# Patient Record
Sex: MALE | Race: WHITE | URBAN - METROPOLITAN AREA
[De-identification: names, ages, dates, MRNs, and addresses within clinical notes are randomized per-mention and may not be internally consistent; named-entity substitution may affect disease eponyms.]

---

## 2019-01-03 ENCOUNTER — APPOINTMENT (OUTPATIENT)
Dept: CT IMAGING | Facility: CLINIC | Age: 39
End: 2019-01-03
Payer: MEDICAID

## 2019-01-03 ENCOUNTER — HOSPITAL ENCOUNTER (EMERGENCY)
Facility: CLINIC | Age: 39
Discharge: HOME | End: 2019-01-03
Attending: EMERGENCY MEDICINE | Admitting: EMERGENCY MEDICINE
Payer: MEDICAID

## 2019-01-03 VITALS
DIASTOLIC BLOOD PRESSURE: 81 MMHG | HEART RATE: 90 BPM | RESPIRATION RATE: 18 BRPM | HEIGHT: 63 IN | BODY MASS INDEX: 30.83 KG/M2 | WEIGHT: 174 LBS | TEMPERATURE: 99.1 F | OXYGEN SATURATION: 97 % | SYSTOLIC BLOOD PRESSURE: 116 MMHG

## 2019-01-03 DIAGNOSIS — R10.84 ABDOMINAL PAIN, GENERALIZED: ICD-10-CM

## 2019-01-03 LAB
ALBUMIN SERPL-MCNC: 3.9 G/DL (ref 3.4–5)
ALBUMIN UR-MCNC: NEGATIVE MG/DL
ALP SERPL-CCNC: 83 U/L (ref 40–150)
ALT SERPL W P-5'-P-CCNC: 47 U/L (ref 0–70)
ANION GAP SERPL CALCULATED.3IONS-SCNC: 8 MMOL/L (ref 3–14)
APPEARANCE UR: CLEAR
AST SERPL W P-5'-P-CCNC: 19 U/L (ref 0–45)
BASOPHILS # BLD AUTO: 0 10E9/L (ref 0–0.2)
BASOPHILS NFR BLD AUTO: 0.2 %
BILIRUB SERPL-MCNC: 0.6 MG/DL (ref 0.2–1.3)
BILIRUB UR QL STRIP: NEGATIVE
BUN SERPL-MCNC: 14 MG/DL (ref 7–30)
CALCIUM SERPL-MCNC: 9.1 MG/DL (ref 8.5–10.1)
CHLORIDE SERPL-SCNC: 99 MMOL/L (ref 94–109)
CO2 SERPL-SCNC: 27 MMOL/L (ref 20–32)
COLOR UR AUTO: YELLOW
CREAT SERPL-MCNC: 0.82 MG/DL (ref 0.66–1.25)
DIFFERENTIAL METHOD BLD: ABNORMAL
EOSINOPHIL # BLD AUTO: 0 10E9/L (ref 0–0.7)
EOSINOPHIL NFR BLD AUTO: 0.1 %
ERYTHROCYTE [DISTWIDTH] IN BLOOD BY AUTOMATED COUNT: 13.4 % (ref 10–15)
GFR SERPL CREATININE-BSD FRML MDRD: >90 ML/MIN/{1.73_M2}
GLUCOSE SERPL-MCNC: 108 MG/DL (ref 70–99)
GLUCOSE UR STRIP-MCNC: NEGATIVE MG/DL
HCT VFR BLD AUTO: 42.3 % (ref 40–53)
HGB BLD-MCNC: 14.4 G/DL (ref 13.3–17.7)
HGB UR QL STRIP: NEGATIVE
IMM GRANULOCYTES # BLD: 0 10E9/L (ref 0–0.4)
IMM GRANULOCYTES NFR BLD: 0.3 %
KETONES UR STRIP-MCNC: NEGATIVE MG/DL
LEUKOCYTE ESTERASE UR QL STRIP: NEGATIVE
LIPASE SERPL-CCNC: 76 U/L (ref 73–393)
LYMPHOCYTES # BLD AUTO: 1.4 10E9/L (ref 0.8–5.3)
LYMPHOCYTES NFR BLD AUTO: 11.6 %
MCH RBC QN AUTO: 28.5 PG (ref 26.5–33)
MCHC RBC AUTO-ENTMCNC: 34 G/DL (ref 31.5–36.5)
MCV RBC AUTO: 84 FL (ref 78–100)
MONOCYTES # BLD AUTO: 1.3 10E9/L (ref 0–1.3)
MONOCYTES NFR BLD AUTO: 11.1 %
MUCOUS THREADS #/AREA URNS LPF: PRESENT /LPF
NEUTROPHILS # BLD AUTO: 9.1 10E9/L (ref 1.6–8.3)
NEUTROPHILS NFR BLD AUTO: 76.7 %
NITRATE UR QL: NEGATIVE
NRBC # BLD AUTO: 0 10*3/UL
NRBC BLD AUTO-RTO: 0 /100
PH UR STRIP: 6 PH (ref 5–7)
PLATELET # BLD AUTO: 255 10E9/L (ref 150–450)
POTASSIUM SERPL-SCNC: 4.1 MMOL/L (ref 3.4–5.3)
PROT SERPL-MCNC: 8.8 G/DL (ref 6.8–8.8)
RBC # BLD AUTO: 5.05 10E12/L (ref 4.4–5.9)
RBC #/AREA URNS AUTO: 1 /HPF (ref 0–2)
SODIUM SERPL-SCNC: 134 MMOL/L (ref 133–144)
SOURCE: ABNORMAL
SP GR UR STRIP: 1.04 (ref 1–1.03)
UROBILINOGEN UR STRIP-MCNC: NORMAL MG/DL (ref 0–2)
WBC # BLD AUTO: 11.9 10E9/L (ref 4–11)
WBC #/AREA URNS AUTO: 1 /HPF (ref 0–5)

## 2019-01-03 PROCEDURE — 83690 ASSAY OF LIPASE: CPT | Performed by: EMERGENCY MEDICINE

## 2019-01-03 PROCEDURE — 25000125 ZZHC RX 250: Performed by: EMERGENCY MEDICINE

## 2019-01-03 PROCEDURE — 85025 COMPLETE CBC W/AUTO DIFF WBC: CPT | Performed by: EMERGENCY MEDICINE

## 2019-01-03 PROCEDURE — 80053 COMPREHEN METABOLIC PANEL: CPT | Performed by: EMERGENCY MEDICINE

## 2019-01-03 PROCEDURE — 81001 URINALYSIS AUTO W/SCOPE: CPT | Performed by: EMERGENCY MEDICINE

## 2019-01-03 PROCEDURE — 74177 CT ABD & PELVIS W/CONTRAST: CPT

## 2019-01-03 PROCEDURE — 25000128 H RX IP 250 OP 636: Performed by: EMERGENCY MEDICINE

## 2019-01-03 PROCEDURE — 99285 EMERGENCY DEPT VISIT HI MDM: CPT | Mod: 25

## 2019-01-03 RX ORDER — IOPAMIDOL 755 MG/ML
88 INJECTION, SOLUTION INTRAVASCULAR ONCE
Status: COMPLETED | OUTPATIENT
Start: 2019-01-03 | End: 2019-01-03

## 2019-01-03 RX ADMIN — IOPAMIDOL 88 ML: 755 INJECTION, SOLUTION INTRAVENOUS at 08:49

## 2019-01-03 RX ADMIN — SODIUM CHLORIDE 67 ML: 9 INJECTION, SOLUTION INTRAVENOUS at 08:49

## 2019-01-03 ASSESSMENT — ENCOUNTER SYMPTOMS
DIARRHEA: 0
CONSTIPATION: 0
DYSURIA: 0
NAUSEA: 1
ABDOMINAL PAIN: 1
VOMITING: 0

## 2019-01-03 ASSESSMENT — MIFFLIN-ST. JEOR: SCORE: 1609.26
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
## 2019-01-03 NOTE — ED AVS SNAPSHOT
Emergency Department  [address] [facility] [address]  Phone:  [phone]  Fax:  [phone]                                    Marcelino Lopez   MRN: [id number]    Department:   Emergency Department   Date of Visit:  1/3/2019           After Visit Summary Signature Page    I have received my discharge instructions, and my questions have been answered. I have discussed any challenges I see with this plan with the nurse or doctor.    ..........................................................................................................................................  Patient/Patient Representative Signature      ..........................................................................................................................................  Patient Representative Print Name and Relationship to Patient    ..................................................               ................................................  Date                                   Time    ..........................................................................................................................................  Reviewed by Signature/Title    ...................................................              ..............................................  Date                                               Time          22EPIC Rev 08/18

## 2019-01-24 ENCOUNTER — HOSPITAL ENCOUNTER (EMERGENCY)
Facility: CLINIC | Age: 39
Discharge: HOME | End: 2019-01-24
Attending: EMERGENCY MEDICINE | Admitting: EMERGENCY MEDICINE
Payer: MEDICAID

## 2019-01-24 VITALS
SYSTOLIC BLOOD PRESSURE: 148 MMHG | TEMPERATURE: 98 F | RESPIRATION RATE: 16 BRPM | BODY MASS INDEX: 28.35 KG/M2 | WEIGHT: 160 LBS | DIASTOLIC BLOOD PRESSURE: 79 MMHG | HEART RATE: 72 BPM | OXYGEN SATURATION: 97 %

## 2019-01-24 DIAGNOSIS — M25.462 EFFUSION OF LEFT KNEE: ICD-10-CM

## 2019-01-24 LAB
APPEARANCE FLD: NORMAL
COLOR FLD: NORMAL
CRYSTALS SNV MICRO: NORMAL
GLUCOSE FLD-MCNC: 18 MG/DL
GRAM STN SPEC: NORMAL
LYMPHOCYTES NFR FLD MANUAL: 3 %
MONOS+MACROS NFR FLD MANUAL: 39 %
NEUTS BAND NFR FLD MANUAL: 58 %
PROT FLD-MCNC: 4.9 G/DL
SPECIMEN SOURCE FLD: NORMAL
SPECIMEN SOURCE: NORMAL
WBC # FLD AUTO: 5923 /UL

## 2019-01-24 PROCEDURE — 87070 CULTURE OTHR SPECIMN AEROBIC: CPT | Performed by: EMERGENCY MEDICINE

## 2019-01-24 PROCEDURE — 20610 DRAIN/INJ JOINT/BURSA W/O US: CPT | Mod: LT

## 2019-01-24 PROCEDURE — 89051 BODY FLUID CELL COUNT: CPT | Performed by: EMERGENCY MEDICINE

## 2019-01-24 PROCEDURE — 89060 EXAM SYNOVIAL FLUID CRYSTALS: CPT | Performed by: EMERGENCY MEDICINE

## 2019-01-24 PROCEDURE — 87205 SMEAR GRAM STAIN: CPT | Performed by: EMERGENCY MEDICINE

## 2019-01-24 PROCEDURE — 82945 GLUCOSE OTHER FLUID: CPT | Performed by: EMERGENCY MEDICINE

## 2019-01-24 PROCEDURE — 99284 EMERGENCY DEPT VISIT MOD MDM: CPT | Mod: 25

## 2019-01-24 PROCEDURE — 84157 ASSAY OF PROTEIN OTHER: CPT | Performed by: EMERGENCY MEDICINE

## 2019-01-24 RX ORDER — IBUPROFEN 600 MG/1
600 TABLET, FILM COATED ORAL EVERY 8 HOURS PRN
Qty: 30 TABLET | Refills: 0 | Status: SHIPPED | OUTPATIENT
Start: 2019-01-24 | End: 2019-02-23

## 2019-01-24 ASSESSMENT — ENCOUNTER SYMPTOMS
JOINT SWELLING: 1
FEVER: 0
DIAPHORESIS: 0
CHILLS: 0

## 2019-01-24 NOTE — ED PROVIDER NOTES
History     Chief Com plaint:  Knee swelling, pain    HPI   Marcelino Lopez is a 38 year old male who presents with left knee pain and swelling. The patient states that 4-5 days ago he began experiencing pain in his left knee and this morning he noticed that his knee was swollen and he was not able to bend the knee very well. The knee is more painful when standing. The patient denies any recent fever, chills, sweats, other aches or swelling, or penile discharge. He denies a history of STIs. 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     Allergies:  No Known Drug Allergies     Medications:    Medications reviewed. No current medications.      Past Medical History:    Medical history reviewed. No pertinent medical history.     Past Surgical History:    Surgical history reviewed. No pertinent surgical history.     Family History:    Family history reviewed. No pertinent family history.      Social History:  Patient presents alone  Current some day smoker  Positive for alcohol use.     Review of Systems   Constitutional: Negative for chills, diaphoresis and fever.   Genitourinary: Negative for discharge.   Musculoskeletal: Positive for joint swelling.        Left knee pain   All other systems reviewed and are negative.      Physical Exam   First Vitals:  BP: 125/74  Pulse: 68  Heart Rate: 75  Temp: 98  F (36.7  C)  Resp: 16  Weight: 72.6 kg (160 lb)  SpO2: 98 %      Physical Exam  General: Resting comfortably on the rney  Head:  The scalp, face, and head appear normal  Eyes:  The pupils are equal, round, and reactive to light    There is no nystagmus    Extraocular muscles are intact    Conjunctivae and sclerae are normal  ENT:    The nose is normal    Pinnae are normal    External acoustic canals are normal    Tympanic membranes are normal    The oropharynx is normal    Uvula is in the midline  Neck:  Normal range of motion    There is no rigidity noted    There is no midline cervical  spine pain/tenderness    Trachea is in the midline    No mass is detected  CV:  Regular rate and underlying rhythm     Normal S1 and S2    No S3 or S4    No pathological murmur detected  Resp:  Lungs are clear    There is no tachypnea    Non-labored    No rales    No wheezing   GI:  Abdomen is soft, there is no rigidity    No distension    No tympani    No rebound tenderness     Non-surgical without peritoneal features  :  Penis is normal    No urethral discharge    Uncircumcised    Testicles normal and non-tender, no mass    Epididymis normal    No inguinal hernia  MS:  Normal muscular tone    Symmetric motor strength    No major joint effusions    No asymmetric swelling. Left knee there is a moderate effusion noted. No warmth, redness, or   significant pain. The right knee is normal.     No calf tenderness  Skin:  No rash or acute skin lesions noted  Neuro: Speech is normal and fluent  Psych:  Awake. Alert.  Normal affect.  Appropriate interactions.  Lymph: No anterior or posterior cervical lymphadenopathy noted. No inguinal adenopathy.         Emergency Department Course   Laboratory:  Crystal ID joint fluid: No clinically significant crystals seen.   Cell count with differential fluid: all normal  Glucose fluid: 18  Protein fluid: protein total fluid: 4.9  Gram stain: No organisms seen (preliminary result)  Fluid culture aerobic bacterial: pending    Procedures:       Arthrocentesis     PERFORMED BY: Ryley Kinney MD.    SITE: Left knee.    INDICATION:  Knee effusion.    CONSENT: Obtained from patient.     ANESTHESIA: Bupivacaine 0.25 % without epi    NOTE:  Using sterile technique the site was prepped with above anesthetic. I used a lateral approach. The joint was entered and 38 cc's of blood tinged, straw tolo colored fluid was withdrawn and sent for laboratory analysis, results above.     COMPLICATIONS: Patient tolerated the procedure well with no immediate complications.    POST-PROCEDURE INSTRUCTIONS:  The patient is asked to continue to rest the joint for a few more days before resuming regular activities.  It may be more painful for the first 1-2 days.  Watch for fever, or increased swelling or persistent pain in the joint.     Emergency Department Course:  Nursing notes and vitals reviewed.  1122: I performed an exam of the patient as documented above.     1205: I rechecked the patient. I performed a knee arthrocentesis (see procedure note).    1418: I rechecked the patient. Findings and plan explained to the Patient. Patient discharged home with instructions regarding supportive care, medications, and reasons to return. The importance of close follow-up was reviewed.     Impression & Plan    Medical Decision Making:  This patient presents with a recent onset of left knee swelling.  He is on his feet frequently at work.  The patient did have a recent probable viral infection 2-3 weeks ago which could serve as a precipitant for a reactive inflammatory arthropathy.  The patient has no apparent risk factor for gonococcal monoarticular arthritis.  The joint fluid analysis is consistent with a low-grade inflammatory arthropathy.  Gram stain is negative with a low white count.  Antibiotics will not be prescribed at this time.  A knee immobilizer is placed for comfort and also to help prevent reaccumulation of inflammatory joint fluid.  Orthopedic follow-up as indicated next week we will call the patient if there is an abnormal culture result.        Diagnosis:    ICD-10-CM    1. Effusion of left knee M25.462        Disposition:  discharged to home    Discharge Medications:     Medication List      Started    ibuprofen 600 MG tablet  Commonly known as:  ADVIL/MOTRIN  600 mg, Oral, EVERY 8 HOURS PRN              Daryl WARREN, am serving as a scribe on 1/24/2019 at 11:22 AM to personally document services performed by Ryley Kinney MD based on my observations and the provider's statements to me.     Daryl  Gigstad  1/24/2019    EMERGENCY DEPARTMENT       Ryley Kinney MD  01/24/19 9416

## 2019-01-24 NOTE — DISCHARGE INSTRUCTIONS
Follow-up with orthopedic surgery next week for consultation and reevaluation  Wear your knee splint for the next 2-3 days when up and about and walking, you may take it off during sleeping  We will call you if your joint fluid culture is positive  Take ibuprofen as directed

## 2019-01-24 NOTE — ED AVS SNAPSHOT
Emergency Department  [address] [facility] [address]  Phone:  [phone]  Fax:  [phone]                                    Marcelino Lopze   MRN: [id number]    Department:   Emergency Department   Date of Visit:  1/24/2019           After Visit Summary Signature Page    I have received my discharge instructions, and my questions have been answered. I have discussed any challenges I see with this plan with the nurse or doctor.    ..........................................................................................................................................  Patient/Patient Representative Signature      ..........................................................................................................................................  Patient Representative Print Name and Relationship to Patient    ..................................................               ................................................  Date                                   Time    ..........................................................................................................................................  Reviewed by Signature/Title    ...................................................              ..............................................  Date                                               Time          22EPIC Rev 08/18

## 2019-01-24 NOTE — LETTER
January 24, 2019      To Whom It May Concern:      Asif Gibran Lopez was seen in our Emergency Department today, 01/24/19.  I expect his condition to improve over the next few days.  He may return to work/school on Monday.    Sincerely,

## 2019-01-25 ENCOUNTER — APPOINTMENT (OUTPATIENT)
Dept: GENERAL RADIOLOGY | Facility: CLINIC | Age: 39
End: 2019-01-25
Payer: MEDICAID

## 2019-01-25 ENCOUNTER — HOSPITAL ENCOUNTER (EMERGENCY)
Facility: CLINIC | Age: 39
Discharge: HOME | End: 2019-01-25
Attending: EMERGENCY MEDICINE | Admitting: EMERGENCY MEDICINE
Payer: MEDICAID

## 2019-01-25 VITALS
OXYGEN SATURATION: 99 % | HEART RATE: 72 BPM | DIASTOLIC BLOOD PRESSURE: 83 MMHG | TEMPERATURE: 98.6 F | RESPIRATION RATE: 16 BRPM | SYSTOLIC BLOOD PRESSURE: 151 MMHG

## 2019-01-25 DIAGNOSIS — M19.90 INFLAMMATORY ARTHRITIS: ICD-10-CM

## 2019-01-25 DIAGNOSIS — M25.462 EFFUSION OF BOTH KNEE JOINTS: ICD-10-CM

## 2019-01-25 DIAGNOSIS — M25.461 EFFUSION OF BOTH KNEE JOINTS: ICD-10-CM

## 2019-01-25 LAB
ANION GAP SERPL CALCULATED.3IONS-SCNC: 6 MMOL/L (ref 3–14)
BASOPHILS # BLD AUTO: 0 10E9/L (ref 0–0.2)
BASOPHILS NFR BLD AUTO: 0.3 %
BUN SERPL-MCNC: 11 MG/DL (ref 7–30)
CALCIUM SERPL-MCNC: 9 MG/DL (ref 8.5–10.1)
CHLORIDE SERPL-SCNC: 105 MMOL/L (ref 94–109)
CO2 SERPL-SCNC: 28 MMOL/L (ref 20–32)
CREAT SERPL-MCNC: 0.81 MG/DL (ref 0.66–1.25)
CRP SERPL-MCNC: 61.9 MG/L (ref 0–8)
DIFFERENTIAL METHOD BLD: NORMAL
EOSINOPHIL # BLD AUTO: 0.1 10E9/L (ref 0–0.7)
EOSINOPHIL NFR BLD AUTO: 0.8 %
ERYTHROCYTE [DISTWIDTH] IN BLOOD BY AUTOMATED COUNT: 13.1 % (ref 10–15)
ERYTHROCYTE [SEDIMENTATION RATE] IN BLOOD BY WESTERGREN METHOD: 41 MM/H (ref 0–15)
GFR SERPL CREATININE-BSD FRML MDRD: >90 ML/MIN/{1.73_M2}
GLUCOSE SERPL-MCNC: 117 MG/DL (ref 70–99)
HCT VFR BLD AUTO: 40.7 % (ref 40–53)
HGB BLD-MCNC: 13.8 G/DL (ref 13.3–17.7)
IMM GRANULOCYTES # BLD: 0 10E9/L (ref 0–0.4)
IMM GRANULOCYTES NFR BLD: 0.3 %
LYMPHOCYTES # BLD AUTO: 2.3 10E9/L (ref 0.8–5.3)
LYMPHOCYTES NFR BLD AUTO: 31.7 %
MCH RBC QN AUTO: 28.5 PG (ref 26.5–33)
MCHC RBC AUTO-ENTMCNC: 33.9 G/DL (ref 31.5–36.5)
MCV RBC AUTO: 84 FL (ref 78–100)
MONOCYTES # BLD AUTO: 0.9 10E9/L (ref 0–1.3)
MONOCYTES NFR BLD AUTO: 12.5 %
NEUTROPHILS # BLD AUTO: 3.9 10E9/L (ref 1.6–8.3)
NEUTROPHILS NFR BLD AUTO: 54.4 %
NRBC # BLD AUTO: 0 10*3/UL
NRBC BLD AUTO-RTO: 0 /100
PLATELET # BLD AUTO: 299 10E9/L (ref 150–450)
POTASSIUM SERPL-SCNC: 4.2 MMOL/L (ref 3.4–5.3)
RBC # BLD AUTO: 4.85 10E12/L (ref 4.4–5.9)
SODIUM SERPL-SCNC: 139 MMOL/L (ref 133–144)
URATE SERPL-MCNC: 5.9 MG/DL (ref 3.5–7.2)
WBC # BLD AUTO: 7.1 10E9/L (ref 4–11)

## 2019-01-25 PROCEDURE — 85025 COMPLETE CBC W/AUTO DIFF WBC: CPT | Performed by: EMERGENCY MEDICINE

## 2019-01-25 PROCEDURE — 99284 EMERGENCY DEPT VISIT MOD MDM: CPT

## 2019-01-25 PROCEDURE — 80048 BASIC METABOLIC PNL TOTAL CA: CPT | Performed by: EMERGENCY MEDICINE

## 2019-01-25 PROCEDURE — 87491 CHLMYD TRACH DNA AMP PROBE: CPT | Performed by: EMERGENCY MEDICINE

## 2019-01-25 PROCEDURE — 73562 X-RAY EXAM OF KNEE 3: CPT | Mod: 50

## 2019-01-25 PROCEDURE — 84550 ASSAY OF BLOOD/URIC ACID: CPT | Performed by: EMERGENCY MEDICINE

## 2019-01-25 PROCEDURE — 85652 RBC SED RATE AUTOMATED: CPT | Performed by: EMERGENCY MEDICINE

## 2019-01-25 PROCEDURE — 86140 C-REACTIVE PROTEIN: CPT | Performed by: EMERGENCY MEDICINE

## 2019-01-25 RX ORDER — ACETAMINOPHEN 500 MG
500-1000 TABLET ORAL EVERY 8 HOURS PRN
Qty: 60 TABLET | Refills: 0 | Status: SHIPPED | OUTPATIENT
Start: 2019-01-25 | End: 2019-02-01

## 2019-01-25 RX ORDER — PREDNISONE 20 MG/1
TABLET ORAL
Qty: 10 TABLET | Refills: 0 | Status: SHIPPED | OUTPATIENT
Start: 2019-01-25 | End: 2019-02-01

## 2019-01-25 RX ORDER — FAMOTIDINE 20 MG/1
20 TABLET, FILM COATED ORAL 2 TIMES DAILY
Qty: 28 TABLET | Refills: 0 | Status: SHIPPED | OUTPATIENT
Start: 2019-01-25 | End: 2019-02-08

## 2019-01-25 ASSESSMENT — ENCOUNTER SYMPTOMS
VOMITING: 0
NUMBNESS: 0
JOINT SWELLING: 1
NAUSEA: 1

## 2019-01-25 NOTE — ED PROVIDER NOTES
History     Chief Complaint:  Knee swelling, pain      TALON Lopez is a 38 year old male who presents with bilateral knee swelling and pain. The patient was here yesterday for 5 days of left knee swelling and pain and had an arthrocentesis performed (see note below). Today, he returns due to swelling in his left knee returning and also developing in his right knee. The knees are not painful at rest, but start to hurt when he tries to walk or change directions. The patient reports that his legs feel cold and seem stiff. He notes that he experienced nausea yesterday after eating, a recent subjective fever, and an eye twitch yesterday. The patient denies any vomiting, falls or injuries, numbness, tingling, or penile discharge. He denies any concerns for UTI and notes that the ED physician yesterday performed an unremarkable  exam. The patient has been taking ibuprofen and using a knee immobilizer. He has a follow up appointment in two weeks with orthopedics.       1/24 ED Note  HPI   Marcelino Lopez is a 38 year old male who presents with left knee pain and swelling. The patient states that 4-5 days ago he began experiencing pain in his left knee and this morning he noticed that his knee was swollen and he was not able to bend the knee very well. The knee is more painful when standing. The patient denies any recent fever, chills, sweats, other aches or swelling, or penile discharge. He denies a history of STIs. 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     Allergies:  No Known Drug Allergies     Medications:    Ibuprofen     Past Medical History:    Left knee effusion     Past Surgical History:    Surgical history reviewed. No pertinent surgical history.     Family History:    Family history reviewed. No pertinent family history.      Social History:  Patient presents alone  Current some day smoker  Positive for alcohol use.   Marital Status:  Single      Review  of Systems   Gastrointestinal: Positive for nausea. Negative for vomiting.   Genitourinary: Negative for discharge.   Musculoskeletal: Positive for gait problem and joint swelling.   Neurological: Negative for numbness.   All other systems reviewed and are negative.      Physical Exam   First Vitals:  BP: 144/71  Pulse: 72  Temp: 98.6  F (37  C)  Resp: 18  SpO2: 98 %      Physical Exam  General: Resting on the bed.  Head: No obvious trauma to head.  Ears, Nose, Throat:  External ears normal.  Nose normal.  N  Eyes:  Conjunctivae clear.  Pupils are equal, round, and reactive.   Neck: Normal range of motion.  Neck supple.   CV: Regular rate and rhythm.  No murmurs.      Respiratory: Effort normal and breath sounds normal.  No wheezing or crackles.   Gastrointestinal: Soft.  No distension. There is no tenderness.   Musculoskeletal: Left knee with appreciable suprapatellar effusion on examination.  Mildly decreased range of motion secondary to pain.  No ligamentous laxity on exam to valgus or varus strain.  Negative anterior posterior drawer test.  Right knee with improved range of motion and no gross effusion noted.  No laxity on exam either.  Sensation intact distally.  2+ DP pulses intact distally.  Nontender posterior calves.  Skin: Skin is warm and dry.  No rash noted.     Emergency Department Course   Imaging:  Radiographic findings were communicated with the patient who voiced understanding of the findings.  X-ray knee bilateral 3 views:  Left knee shows no acute fracture or dislocation. Anatomic  alignment. Lateral view on the left shows a suprapatellar effusion.  Right knee shows no acute fracture or dislocation. There may be a very  small right suprapatellar effusion as well.  Result per radiology.      Laboratory:  CBC: WBC: 7.1, HGB: 133.8, PLT: 299  BMP: Glucose 117 (H),  o/w WNL (Creatinine: 0.81)  CRP: 61.9 (H)  Erythrocyte Sed Rate: 41 (H)  Uric acid: 5.9    Emergency Department Course:  Nursing notes  and vitals reviewed.  0945: I performed an exam of the patient as documented above.     1130: I rechecked the patient. Explained findings to patient.    1145: I discussed the case with Mansi Goldberg PA-C of orthopedics regarding the patient.    1215: I rechecked the patient. Findings and plan explained to the Patient. Patient discharged home with instructions regarding supportive care, medications, and reasons to return. The importance of close follow-up was reviewed.     Impression & Plan    Medical Decision Makin-year-old male otherwise largely healthy presents with knee swelling left greater than right.  Vital signs largely unremarkable.  Broad differential pursued including but not limited to inflammatory arthritis, gouty arthritis, septic joint, trauma, fracture dislocation, neurovascular injury, etc.  CBC shows no leukocytosis or anemia.  BMP shows no acute electrolyte metabolic or renal dysfunction.  Uric acid levels normal in addition reviewed the crystals from yesterday shows no evidence of crystals.  No suggestion of gout or pseudogout.  CRP and ESR are elevated consistent with an inflammatory process.  Reviewed cell culture and cell count from yesterday both are negative.  No evidence of infection per recent of joint arthrocentesis.  X-ray shows no evidence of acute fracture or dislocation.  X-ray does note the left suprapatellar effusion.  There may also to be a small right suprapatellar effusion.  At this point there is no evidence of septic joint with recent negative arthrocentesis.  There is no evidence of fracture or dislocation.  Patient is neurovascular intact.  The effusions are localized to the knee no evidence of any posterior inflammation or swelling to suggest DVT or other incident.  This is the most consistent with inflammatory arthritis.  Given that it is bilateral we did discuss with orthopedics.  They did recommend gonorrhea and Chlamydia testing which are pending.  orthopedics  also agreed that no further arthrocentesis would be of benefit given the results from yesterday returned normal.  They discussed trying a course of prednisone to see if this would help with inflammatory arthritis.  The patient needs to be rechecked by primary doctor on Monday.  Patient likely needs a rheumatology follow-up.  Patient also likely needs follow-up with orthopedics and has appointment scheduled on Thursday.  Patient's knee immobilizer discontinued as this may be worsening his stiffness and was placed in a Ace wrap.  Patient was given crutches for support.  Strict return precautions were discussed.  Advised with any change return to the emergency department.  Patient was discharged in stable condition.    Diagnosis:    ICD-10-CM    1. Inflammatory arthritis M19.90    2. Effusion of both knee joints M25.461     M25.462        Disposition:  discharged to home    Discharge Medications:     Medication List      Started    predniSONE 20 MG tablet  Commonly known as:  DELTASONE  Take two tablets (= 40mg) each day for 5 (five) days          IDaryl, frederick serving as a scribe on 1/25/2019 at 11:44 AM to personally document services performed by Juliette Frye MD based on my observations and the provider's statements to me.       Daryl Hillman  1/25/2019    EMERGENCY DEPARTMENT       Juliette Frye MD  01/25/19 1446

## 2019-01-25 NOTE — DISCHARGE INSTRUCTIONS
Try prednisone burst for the knee inflammation in addition to gentle compression with ACE wrap and icing of the knee.  Rest as needed.  If note fevers, worsening redness or swelling, rash or other changes return to the ED.  Follow up with your PCP in 2-3 days for a recheck and possible rheumatology consult.

## 2019-01-25 NOTE — ED AVS SNAPSHOT
Emergency Department  [address] [facility] [address]  Phone:  [phone]  Fax:  [phone]                                    Marcelino Lopez   MRN: [id number]    Department:   Emergency Department   Date of Visit:  1/25/2019           After Visit Summary Signature Page    I have received my discharge instructions, and my questions have been answered. I have discussed any challenges I see with this plan with the nurse or doctor.    ..........................................................................................................................................  Patient/Patient Representative Signature      ..........................................................................................................................................  Patient Representative Print Name and Relationship to Patient    ..................................................               ................................................  Date                                   Time    ..........................................................................................................................................  Reviewed by Signature/Title    ...................................................              ..............................................  Date                                               Time          22EPIC Rev 08/18

## 2019-01-25 NOTE — LETTER
January 25, 2019      To Whom It May Concern:      Marcelino Lopez was seen in our Emergency Department today, 01/25/19.  I expect his condition to improve over the next days.  He may return to work/school 1/29/19    Sincerely,        Gwendolyn Seaman RN

## 2019-01-25 NOTE — RESULT ENCOUNTER NOTE
[facility] ED discharge antibiotic (if prescribed):  None  No changes in treatment per General culture protocol.

## 2019-01-27 LAB
C TRACH DNA SPEC QL NAA+PROBE: NEGATIVE
SPECIMEN SOURCE: NORMAL

## 2019-01-29 LAB
BACTERIA SPEC CULT: NO GROWTH
SPECIMEN SOURCE: NORMAL